# Patient Record
Sex: MALE | Race: WHITE | HISPANIC OR LATINO | Employment: OTHER | URBAN - METROPOLITAN AREA
[De-identification: names, ages, dates, MRNs, and addresses within clinical notes are randomized per-mention and may not be internally consistent; named-entity substitution may affect disease eponyms.]

---

## 2023-09-22 ENCOUNTER — APPOINTMENT (OUTPATIENT)
Dept: GENERAL RADIOLOGY | Facility: HOSPITAL | Age: 48
End: 2023-09-22

## 2023-09-22 ENCOUNTER — APPOINTMENT (OUTPATIENT)
Dept: CT IMAGING | Facility: HOSPITAL | Age: 48
End: 2023-09-22

## 2023-09-22 ENCOUNTER — HOSPITAL ENCOUNTER (EMERGENCY)
Facility: HOSPITAL | Age: 48
Discharge: HOME | End: 2023-09-22
Attending: STUDENT IN AN ORGANIZED HEALTH CARE EDUCATION/TRAINING PROGRAM

## 2023-09-22 VITALS
DIASTOLIC BLOOD PRESSURE: 88 MMHG | TEMPERATURE: 96.8 F | RESPIRATION RATE: 20 BRPM | SYSTOLIC BLOOD PRESSURE: 183 MMHG | HEART RATE: 53 BPM | OXYGEN SATURATION: 100 %

## 2023-09-22 DIAGNOSIS — S01.81XA FOREHEAD LACERATION, INITIAL ENCOUNTER: ICD-10-CM

## 2023-09-22 DIAGNOSIS — S52.501A CLOSED FRACTURE OF DISTAL END OF RIGHT RADIUS, UNSPECIFIED FRACTURE MORPHOLOGY, INITIAL ENCOUNTER: ICD-10-CM

## 2023-09-22 DIAGNOSIS — S09.90XA INJURY OF HEAD, INITIAL ENCOUNTER: Primary | ICD-10-CM

## 2023-09-22 PROCEDURE — 25010000002 MORPHINE PER 10 MG: Performed by: STUDENT IN AN ORGANIZED HEALTH CARE EDUCATION/TRAINING PROGRAM

## 2023-09-22 PROCEDURE — 99284 EMERGENCY DEPT VISIT MOD MDM: CPT

## 2023-09-22 PROCEDURE — 70450 CT HEAD/BRAIN W/O DYE: CPT

## 2023-09-22 PROCEDURE — 90715 TDAP VACCINE 7 YRS/> IM: CPT | Performed by: PHYSICIAN ASSISTANT

## 2023-09-22 PROCEDURE — 25010000002 TETANUS-DIPHTH-ACELL PERTUSSIS 5-2.5-18.5 LF-MCG/0.5 SUSPENSION PREFILLED SYRINGE: Performed by: PHYSICIAN ASSISTANT

## 2023-09-22 PROCEDURE — 96374 THER/PROPH/DIAG INJ IV PUSH: CPT

## 2023-09-22 PROCEDURE — 90471 IMMUNIZATION ADMIN: CPT | Performed by: PHYSICIAN ASSISTANT

## 2023-09-22 PROCEDURE — 73090 X-RAY EXAM OF FOREARM: CPT

## 2023-09-22 PROCEDURE — 73110 X-RAY EXAM OF WRIST: CPT

## 2023-09-22 PROCEDURE — 72125 CT NECK SPINE W/O DYE: CPT

## 2023-09-22 PROCEDURE — 96376 TX/PRO/DX INJ SAME DRUG ADON: CPT

## 2023-09-22 RX ORDER — LIDOCAINE HYDROCHLORIDE AND EPINEPHRINE 10; 10 MG/ML; UG/ML
10 INJECTION, SOLUTION INFILTRATION; PERINEURAL ONCE
Status: DISCONTINUED | OUTPATIENT
Start: 2023-09-22 | End: 2023-09-22 | Stop reason: HOSPADM

## 2023-09-22 RX ORDER — MORPHINE SULFATE 2 MG/ML
4 INJECTION, SOLUTION INTRAMUSCULAR; INTRAVENOUS ONCE
Status: COMPLETED | OUTPATIENT
Start: 2023-09-22 | End: 2023-09-22

## 2023-09-22 RX ORDER — SODIUM CHLORIDE 0.9 % (FLUSH) 0.9 %
10 SYRINGE (ML) INJECTION AS NEEDED
Status: DISCONTINUED | OUTPATIENT
Start: 2023-09-22 | End: 2023-09-22 | Stop reason: HOSPADM

## 2023-09-22 RX ADMIN — MORPHINE SULFATE 4 MG: 2 INJECTION, SOLUTION INTRAMUSCULAR; INTRAVENOUS at 15:21

## 2023-09-22 RX ADMIN — TETANUS TOXOID, REDUCED DIPHTHERIA TOXOID AND ACELLULAR PERTUSSIS VACCINE, ADSORBED 0.5 ML: 5; 2.5; 8; 8; 2.5 SUSPENSION INTRAMUSCULAR at 13:24

## 2023-09-22 RX ADMIN — MORPHINE SULFATE 4 MG: 2 INJECTION, SOLUTION INTRAMUSCULAR; INTRAVENOUS at 13:25

## 2023-09-22 NOTE — ED NOTES
Wound to right forehead cleansed with NS and hibiclens, patted dry, Surgicil applied and covered with 2x2 island dressing.

## 2023-09-22 NOTE — ED PROVIDER NOTES
MD ATTESTATION NOTE    The SCOTT and I have discussed this patient's history, physical exam, and treatment plan.  I have reviewed the documentation and personally had a face to face interaction with the patient. I affirm the documentation and agree with the treatment and plan.  The attached note describes my personal findings.      I provided a substantive portion of the care of the patient.  I personally performed the physical exam in its entirety, and below are my findings.  For this patient encounter, the patient wore surgical mask, I wore full protective PPE including N95 and eye protection.      Brief HPI: Patient presented emergency department with head injury and right forearm injury status post fall from ladder.  Obvious deformity to the right forearm.  Full range of motion of the hand.  No other injuries or pain.    PHYSICAL EXAM  ED Triage Vitals [09/22/23 1222]   Temp Heart Rate Resp BP SpO2   96.8 °F (36 °C) 51 20 170/92 99 %      Temp src Heart Rate Source Patient Position BP Location FiO2 (%)   Tympanic -- -- -- --         GENERAL: no acute distress  HENT: nares patent, contusion to the right frontal scalp  EYES: no scleral icterus  CV: regular rhythm, normal rate  RESPIRATORY: normal effort  ABDOMEN: soft  MUSCULOSKELETAL: Deformity with dorsal displacement of the distal segment of the right forearm, associated swelling  NEURO: alert, moves all extremities, follows commands  PSYCH:  calm, cooperative  SKIN: warm, dry    Vital signs and nursing notes reviewed.        Plan: Patient presented emergency department with multiple injuries status post fall.  Otherwise well-appearing, vitals otherwise stable.  Treated with pain medication.  Plan for CT and x-ray for further evaluation.  CT reassuring, x-ray demonstrating distal radius fracture.  Placed in splint.  Discharged with outpatient follow-up.    ED Course as of 09/22/23 2058   Fri Sep 22, 2023   1434 X-ray right forearm interpreted myself:  Distal  radius fracture with slight dorsal displacement of the distal fragment [FS]   1446 The patient's forehead wound has been irrigated thoroughly and upon reassessment it appears that this is a skin avulsion wound as opposed to a laceration.  No indication for suture repair or primary closure in any fashion.  Plan for Surgicel and gauze dressing.  The wound defect appears to only be approximately 1 cm in diameter.  [DC]   1526 CT head interpreted myself:  Large frontal scalp contusion on the right with no evidence of intracranial hemorrhage or midline shift. [FS]      ED Course User Index  [DC] Ramesh De Jesus, PA  [FS] Patrcie Lang MD Shary, Frank S, MD  09/22/23 2058

## 2023-09-22 NOTE — ED PROVIDER NOTES
" EMERGENCY DEPARTMENT ENCOUNTER    Room Number:  08/08  Date of encounter:  9/22/2023  PCP: No primary care provider on file.  Historian: Patient  Full history not obtainable due to: None,  used for translation without difficulty    HPI:  Chief Complaint: Right arm pain    Context: Bryson Bliss is a 48 y.o. male who presents to the ED c/o aching pain to the right arm with deformity after a fall from a ladder.  The patient also indicates that he struck his head where he has a laceration of the right-sided forehead.  States that he fell from a height of \"several\" steps on his ladder while he was working with [employer] today.  He did strike his head on the ground and lose consciousness briefly.  Does not take any blood thinners.  No neck pain.  Denies numbness or weakness of the face or extremities, slurred speech, visual disturbance, photophobia.  Obvious deformity noted to the right wrist.  Neurovascularly intact distally to the right upper extremity.      MEDICAL RECORD REVIEW:    No pertinent records in the accessible EMR      PAST MEDICAL HISTORY    Active Ambulatory Problems     Diagnosis Date Noted    No Active Ambulatory Problems     Resolved Ambulatory Problems     Diagnosis Date Noted    No Resolved Ambulatory Problems     No Additional Past Medical History         PAST SURGICAL HISTORY  No past surgical history on file.      FAMILY HISTORY  No family history on file.      SOCIAL HISTORY         ALLERGIES  Patient has no known allergies.        REVIEW OF SYSTEMS    All systems reviewed and marked as negative except as listed in HPI     PHYSICAL EXAM    I have reviewed the triage vital signs and nursing notes.    ED Triage Vitals [09/22/23 1222]   Temp Heart Rate Resp BP SpO2   96.8 °F (36 °C) 51 20 170/92 99 %      Temp src Heart Rate Source Patient Position BP Location FiO2 (%)   Tympanic -- -- -- --       Physical Exam  Constitutional:       General: He is not in acute distress.     Appearance: He is " well-developed.   HENT:      Head:     Eyes:      General: No scleral icterus.     Conjunctiva/sclera: Conjunctivae normal.   Neck:      Trachea: No tracheal deviation.   Cardiovascular:      Rate and Rhythm: Normal rate and regular rhythm.   Pulmonary:      Effort: Pulmonary effort is normal.      Breath sounds: Normal breath sounds.   Abdominal:      Palpations: Abdomen is soft.      Tenderness: There is no abdominal tenderness. There is no guarding.   Musculoskeletal:      Right wrist: Swelling, deformity and tenderness present. Decreased range of motion. Abnormal pulse.      Cervical back: Normal range of motion. No spinous process tenderness or muscular tenderness.   Lymphadenopathy:      Cervical: No cervical adenopathy.   Skin:     General: Skin is warm and dry.   Neurological:      Mental Status: He is alert and oriented to person, place, and time.      Cranial Nerves: Cranial nerves 2-12 are intact.      Sensory: Sensation is intact.      Motor: Motor function is intact.      Coordination: Coordination is intact.   Psychiatric:         Behavior: Behavior normal.       Vital signs and nursing notes reviewed.            LAB RESULTS  No results found for this or any previous visit (from the past 24 hour(s)).    Ordered the above labs and independently reviewed the results.        RADIOLOGY  No Radiology Exams Resulted Within Past 24 Hours    I ordered the above noted radiological studies. Independently reviewed by me and discussed with radiologist.  See dictation above for official radiology interpretation.      PROCEDURES    Splint - Cast - Strapping    Date/Time: 9/22/2023 3:38 PM  Performed by: Ramesh De Jesus PA  Authorized by: Patrice Lang MD     Consent:     Consent obtained:  Verbal    Consent given by:  Patient    Risks, benefits, and alternatives were discussed: yes      Risks discussed:  Discoloration and numbness    Alternatives discussed:  No treatment  Universal protocol:     Imaging studies  available: yes      Patient identity confirmed:  Verbally with patient  Pre-procedure details:     Distal neurologic exam:  Normal    Distal perfusion: distal pulses strong and brisk capillary refill    Procedure details:     Location:  Wrist    Wrist location:  R wrist    Cast type:  Short arm    Splint type:  Sugar tong    Supplies:  Fiberglass and elastic bandage    Attestation: Splint applied and adjusted personally by me    Post-procedure details:     Distal neurologic exam:  Normal    Distal perfusion: distal pulses strong and brisk capillary refill      Procedure completion:  Tolerated well, no immediate complications        MEDICATIONS GIVEN IN ER    Medications - No data to display      PROGRESS, DATA ANALYSIS, CONSULTS, AND MEDICAL DECISION MAKING    All labs have been independently interpreted by me.  All radiology studies have been interpreted by me.  Discussion below represents my analysis of pertinent findings related to patient's condition, differential diagnosis, treatment plan and final disposition.    Patient presentation and evaluation consistent with right wrist fracture and uncomplicated head injury after falling from a ladder.  He did sustain a wound to the right-sided forehead which plan to bandage and have him follow-up as an outpatient for.  No indication for primary closure.  He also has a fracture to the right distal radius that I have placed in a sugar-tong splint and sling to have him follow-up as an outpatient with hand surgery.  Patient is agreeable with this plan.  All questions answered using a .    - Chronic or social conditions impacting care: None      DIFFERENTIAL DIAGNOSIS INCLUDE BUT NOT LIMITED TO:     Intracranial bleeding, concussion, wrist fracture      Orders placed during this visit:  No orders of the defined types were placed in this encounter.        ED Course as of 09/22/23 1537   Fri Sep 22, 2023   1434 X-ray right forearm interpreted myself:  Distal radius  fracture with slight dorsal displacement of the distal fragment [FS]   1446 The patient's forehead wound has been irrigated thoroughly and upon reassessment it appears that this is a skin avulsion wound as opposed to a laceration.  No indication for suture repair or primary closure in any fashion.  Plan for Surgicel and gauze dressing.  The wound defect appears to only be approximately 1 cm in diameter.  [DC]   1526 CT head interpreted myself:  Large frontal scalp contusion on the right with no evidence of intracranial hemorrhage or midline shift. [FS]      ED Course User Index  [DC] Ramesh De Jesus PA  [FS] Patrice Lang MD       AS OF 12:31 EDT VITALS:    BP - 170/92  HR - 51  TEMP - 96.8 °F (36 °C) (Tympanic)  02 SATS - 99%    1541 I rechecked the patient.  I discussed the patient's labs, radiology findings (including all incidental findings), diagnosis, and plan for discharge.  A repeat exam reveals no new worrisome changes from my initial exam findings.  The patient understands that the fact that they are being discharged does not denote that nothing is abnormal, it indicates that no clinical emergency is present and that they must follow-up as directed in order to properly maintain their health.  Follow-up instructions (specifically listed below) and return to ER precautions were given at this time.  I specifically instructed the patient to follow-up with their PCP.  The patient understands and agrees with the plan, and is ready for discharge.  All questions answered.      DIAGNOSIS  Final diagnoses:   Injury of head, initial encounter   Forehead laceration, initial encounter   Closed fracture of distal end of right radius, unspecified fracture morphology, initial encounter         DISPOSITION  D/c    Pt masked in first look. I wore a surgical mask throughout my encounters with the pt. I performed hand hygiene on entry into the pt room and upon exit.     Dictated utilizing Dragon dictation     Note  Disclaimer: At [facility], we believe that sharing information builds trust and better relationships. You are receiving this note because you recently visited [facility]. It is possible you will see health information before a provider has talked with you about it. This kind of information can be easy to misunderstand. To help you fully understand what it means for your health, we urge you to discuss this note with your provider.      Ramesh De Jesus PA  09/25/23 0508